# Patient Record
(demographics unavailable — no encounter records)

---

## 2024-11-18 NOTE — PHYSICAL EXAM
[General Appearance - Well Developed] : well developed [Normal Appearance] : normal appearance [General Appearance - In No Acute Distress] : no acute distress [Edema] : no peripheral edema [Exaggerated Use Of Accessory Muscles For Inspiration] : no accessory muscle use [Abdomen Soft] : soft [Abdomen Tenderness] : non-tender [Normal Station and Gait] : the gait and station were normal for the patient's age [] : no rash [No Focal Deficits] : no focal deficits [Oriented To Time, Place, And Person] : oriented to person, place, and time

## 2024-11-18 NOTE — ASSESSMENT
[FreeTextEntry1] : Patient is a 50 yo M poorly controlled DM who presents for R intrarenal stone.  - Discussed with pt options for stone management including observation/conservative trial of passage, ESWL and URS/Laser litho/stent. - given his poorly controlled DM, A1C of 8.8, he is not a good surgical candidate for elective stone procedure - will observe - Counseled pt on stone dietary recommendations, such as increasing fluid intake and citrate intake (waldo), while decreasing salt, protein and oxalate. - f/u 6mos for renal ULT and will obtain KUB to assess for radioopacity

## 2024-11-18 NOTE — HISTORY OF PRESENT ILLNESS
[FreeTextEntry1] : Patient is a 49-year-old male with primary medical history of HTN, DM, and hyperlipidemia who presents for right nephrolithiasis.   He reports left flank pain two months ago, he underwent abdominal sonogram which noted 7 mm nonobstructing right lower pole stone, no hydronephrosis or solid mass.  This is first time he was found to have renal stone. No prior renal colic episode or stone procedures.  He has never passed stones.  Reports voiding well.  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  No family history of  malignancy.   Labs 8/30/24 - serum creatinine 0/75, calcium 9.5, HA1c 8.8. UA negative for LE/nitrites.   Abd sono 9/23/24 - 7mm RLP stone, no hydro or mass.